# Patient Record
Sex: FEMALE | Race: OTHER | Employment: UNEMPLOYED | URBAN - METROPOLITAN AREA
[De-identification: names, ages, dates, MRNs, and addresses within clinical notes are randomized per-mention and may not be internally consistent; named-entity substitution may affect disease eponyms.]

---

## 2022-04-01 ENCOUNTER — MED REC SCAN ONLY (OUTPATIENT)
Dept: ORTHOPEDICS CLINIC | Facility: CLINIC | Age: 62
End: 2022-04-01

## 2022-05-04 ENCOUNTER — OFFICE VISIT (OUTPATIENT)
Dept: ORTHOPEDICS CLINIC | Facility: CLINIC | Age: 62
End: 2022-05-04

## 2022-05-04 VITALS — WEIGHT: 198.44 LBS | BODY MASS INDEX: 31.15 KG/M2 | HEIGHT: 66.93 IN

## 2022-05-04 DIAGNOSIS — M79.605 LEG PAIN, LEFT: Primary | ICD-10-CM

## 2022-05-04 PROCEDURE — 3008F BODY MASS INDEX DOCD: CPT | Performed by: ORTHOPAEDIC SURGERY

## 2022-05-04 PROCEDURE — 99212 OFFICE O/P EST SF 10 MIN: CPT | Performed by: ORTHOPAEDIC SURGERY

## 2022-05-04 PROCEDURE — 20610 DRAIN/INJ JOINT/BURSA W/O US: CPT | Performed by: ORTHOPAEDIC SURGERY

## 2022-05-04 RX ORDER — BETAMETHASONE SODIUM PHOSPHATE AND BETAMETHASONE ACETATE 3; 3 MG/ML; MG/ML
5.8 INJECTION, SUSPENSION INTRA-ARTICULAR; INTRALESIONAL; INTRAMUSCULAR; SOFT TISSUE ONCE
Status: COMPLETED | OUTPATIENT
Start: 2022-05-04 | End: 2022-05-04

## 2022-05-04 RX ADMIN — BETAMETHASONE SODIUM PHOSPHATE AND BETAMETHASONE ACETATE 6 MG: 3; 3 INJECTION, SUSPENSION INTRA-ARTICULAR; INTRALESIONAL; INTRAMUSCULAR; SOFT TISSUE at 17:30:00

## 2022-05-07 NOTE — PROGRESS NOTES
Patient is a 55-year-old white female here for evaluation of her left leg. Patient's here with her . Patient speaks little bit of English her  more fluent. Patient's been having a problem now for several months with this knee. No specific trauma. Patient states that pain radiates down the leg. Pain starts though somewhere around the knee and the lateral aspect of the knee. Patient has had x-rays taken elsewhere but I am not able to open the file with the imaging. Patient's exam shows that have a mildly antalgic gait. Patient has tenderness along the medial aspect of the left knee. Ligaments are stable. Range of motion shows near full extension and flexion about 135 degrees. Mild effusion of the knee is noted. Negative Homans. Motor exam grossly 5/5 left lower extremity. Sensory intact to light touch. Impression is that of meniscus tear but also some arthritis of the left knee. Recommendations are to go ahead with a cortisone injection today in light of the degree of her pain and then see if we can obtain imaging or open the files for the imaging that she already has. Patient knee was injected with 4 cc Xylocaine and Marcaine and 6 mg of betamethasone. She tolerated the injection well. Follow-up with her in couple weeks if not getting better. We will need to consider an MRI scan if she is not getting better.

## 2022-05-20 ENCOUNTER — HOSPITAL ENCOUNTER (OUTPATIENT)
Age: 62
Discharge: ANOTHER HOSPITAL | End: 2022-05-20

## 2022-05-20 ENCOUNTER — HOSPITAL ENCOUNTER (EMERGENCY)
Facility: HOSPITAL | Age: 62
Discharge: HOME | End: 2022-05-20

## 2022-05-20 VITALS
TEMPERATURE: 98 F | RESPIRATION RATE: 20 BRPM | SYSTOLIC BLOOD PRESSURE: 123 MMHG | HEART RATE: 66 BPM | OXYGEN SATURATION: 94 % | DIASTOLIC BLOOD PRESSURE: 65 MMHG

## 2022-05-20 VITALS
TEMPERATURE: 98 F | DIASTOLIC BLOOD PRESSURE: 85 MMHG | OXYGEN SATURATION: 97 % | HEART RATE: 89 BPM | RESPIRATION RATE: 18 BRPM | SYSTOLIC BLOOD PRESSURE: 145 MMHG | WEIGHT: 198.44 LBS | BODY MASS INDEX: 31.89 KG/M2 | HEIGHT: 66 IN

## 2022-05-20 DIAGNOSIS — U07.1 COVID-19: Primary | ICD-10-CM

## 2022-05-20 LAB — SARS-COV-2 RNA RESP QL NAA+PROBE: DETECTED

## 2022-05-20 PROCEDURE — 99284 EMERGENCY DEPT VISIT MOD MDM: CPT

## 2022-05-20 RX ORDER — BEBTELOVIMAB 87.5 MG/ML
175 INJECTION, SOLUTION INTRAVENOUS ONCE
Status: COMPLETED | OUTPATIENT
Start: 2022-05-20 | End: 2022-05-20

## 2022-05-20 NOTE — ED INITIAL ASSESSMENT (HPI)
Cough since yesterday with body aches. [place] in [place] in [place]22 and was hospitalized 20 days.

## 2022-05-23 ENCOUNTER — LAB ENCOUNTER (OUTPATIENT)
Dept: LAB | Facility: HOSPITAL | Age: 62
End: 2022-05-23
Attending: INTERNAL MEDICINE

## 2022-05-23 ENCOUNTER — TELEPHONE (OUTPATIENT)
Dept: CASE MANAGEMENT | Age: 62
End: 2022-05-23

## 2022-05-23 ENCOUNTER — HOSPITAL ENCOUNTER (OUTPATIENT)
Dept: GENERAL RADIOLOGY | Facility: HOSPITAL | Age: 62
Discharge: HOME | End: 2022-05-23
Attending: INTERNAL MEDICINE

## 2022-05-23 DIAGNOSIS — U07.1 INFECTION DUE TO 2019-NCOV: Primary | ICD-10-CM

## 2022-05-23 DIAGNOSIS — U07.1 INFECTION DUE TO 2019-NCOV: ICD-10-CM

## 2022-05-23 LAB
BASOPHILS # BLD AUTO: 0.03 X10(3) UL (ref 0–0.2)
BASOPHILS NFR BLD AUTO: 0.6 %
DEPRECATED RDW RBC AUTO: 39.4 FL (ref 35.1–46.3)
EOSINOPHIL # BLD AUTO: 0.17 X10(3) UL (ref 0–0.7)
EOSINOPHIL NFR BLD AUTO: 3.4 %
ERYTHROCYTE [DISTWIDTH] IN BLOOD BY AUTOMATED COUNT: 13.2 % (ref 11–15)
HCT VFR BLD AUTO: 41.6 %
HGB BLD-MCNC: 12.9 G/DL
IMM GRANULOCYTES # BLD AUTO: 0.01 X10(3) UL (ref 0–1)
IMM GRANULOCYTES NFR BLD: 0.2 %
LYMPHOCYTES # BLD AUTO: 2.39 X10(3) UL (ref 1–4)
LYMPHOCYTES NFR BLD AUTO: 48.4 %
MCH RBC QN AUTO: 25.6 PG (ref 26–34)
MCHC RBC AUTO-ENTMCNC: 31 G/DL (ref 31–37)
MCV RBC AUTO: 82.7 FL
MONOCYTES # BLD AUTO: 0.33 X10(3) UL (ref 0.1–1)
MONOCYTES NFR BLD AUTO: 6.7 %
NEUTROPHILS # BLD AUTO: 2.01 X10 (3) UL (ref 1.5–7.7)
NEUTROPHILS # BLD AUTO: 2.01 X10(3) UL (ref 1.5–7.7)
NEUTROPHILS NFR BLD AUTO: 40.7 %
PLATELET # BLD AUTO: 211 10(3)UL (ref 150–450)
RBC # BLD AUTO: 5.03 X10(6)UL
WBC # BLD AUTO: 4.9 X10(3) UL (ref 4–11)

## 2022-05-23 PROCEDURE — 85025 COMPLETE CBC W/AUTO DIFF WBC: CPT

## 2022-05-23 PROCEDURE — 71046 X-RAY EXAM CHEST 2 VIEWS: CPT | Performed by: INTERNAL MEDICINE

## 2022-05-23 PROCEDURE — 36415 COLL VENOUS BLD VENIPUNCTURE: CPT

## 2023-05-08 ENCOUNTER — TELEPHONE (OUTPATIENT)
Dept: ORTHOPEDICS CLINIC | Facility: CLINIC | Age: 63
End: 2023-05-08

## 2023-05-08 DIAGNOSIS — M25.562 LEFT KNEE PAIN, UNSPECIFIED CHRONICITY: Primary | ICD-10-CM

## 2023-05-08 DIAGNOSIS — Z01.89 ENCOUNTER FOR LOWER EXTREMITY COMPARISON IMAGING STUDY: ICD-10-CM

## 2023-05-08 NOTE — TELEPHONE ENCOUNTER
Patient has an upcoming appt for LT leg pain/ LEFT KNEE pain . At this time patient has not had any imaging done, please place RX accordingly. Please forward to the  pool to schedule imaging. Thank you.       Future Appointments   Date Time Provider Delores Fernanda   6/2/2023 11:20 AM Rajendra Luna MD [facility]

## 2023-05-22 ENCOUNTER — MED REC SCAN ONLY (OUTPATIENT)
Dept: ORTHOPEDICS CLINIC | Facility: CLINIC | Age: 63
End: 2023-05-22

## 2023-06-02 ENCOUNTER — OFFICE VISIT (OUTPATIENT)
Dept: ORTHOPEDICS CLINIC | Facility: CLINIC | Age: 63
End: 2023-06-02
Payer: MEDICAID

## 2023-06-02 VITALS — BODY MASS INDEX: 31.82 KG/M2 | HEIGHT: 66 IN | WEIGHT: 198 LBS

## 2023-06-02 DIAGNOSIS — M17.12 PRIMARY OSTEOARTHRITIS OF LEFT KNEE: Primary | ICD-10-CM

## 2023-06-02 RX ORDER — ATORVASTATIN CALCIUM 20 MG/1
20 TABLET, FILM COATED ORAL NIGHTLY
COMMUNITY
Start: 2023-05-11

## 2023-06-02 RX ORDER — HYDROCHLOROTHIAZIDE 12.5 MG/1
12.5 TABLET ORAL DAILY
COMMUNITY
Start: 2023-05-24

## 2023-06-03 RX ORDER — TRIAMCINOLONE ACETONIDE 40 MG/ML
40 INJECTION, SUSPENSION INTRA-ARTICULAR; INTRAMUSCULAR ONCE
Status: SHIPPED | OUTPATIENT
Start: 2023-06-03

## 2023-06-03 NOTE — PROGRESS NOTES
Patient is a 79-year-old female here for follow-up on her left knee. Patient's daughter is with her. Patient had a cortisone injection about a year ago which helped her fairly substantially for the last 10 months. Patient states that over the last month or so she has been having some increasing pain. Patient's had the surgery of her left knee for a tibial plateau fracture. This was done over in [facility] where she was at the time of the injury. Patient's exam shows that mild varus alignment of the left knee. She has tenderness along the medial and lateral aspect of the knee. There is no tenderness over the fracture site or over the proximal tibia. Patient has near full extension of the left knee and flexion to about 125 degrees. Exam of her right knee shows her to have no significant swelling. Mild crepitation patellofemoral joint. Range of motion of the right knee is full. Neurologically she is intact lower extremities to light touch. Motor exam is grossly 5/5. Patient's x-rays of her left knee shows the fracture to be well-healed. Internal fixation is in good position. Moderate degenerative changes of the tibiofemoral joint noted. Right knee shows well-maintained tibiofemoral and patellofemoral joints. Impression is that of posttraumatic degenerative arthritis of the left knee. Second impression is that well-healed proximal tibia fracture with retained hardware. Third impression is that of stable right knee with mild chondromalacia of the patellofemoral joint. Recommendation is to go ahead with a cortisone injection of the left knee which was done under sterile technique through an anterolateral approach with 4 cc of Xylocaine and Marcaine 40 mg of Kenalog. Patient tolerated the injection well. No complications. Patient was advised to follow-up as needed. I did reassure the daughter that the surgery that was performed was done well and that her healing has progressed well.   Patient will be a possible candidate for a total knee arthroplasty in the future. Presently the cortisone injections are effective and if these are not working we can always try a gel injection.

## 2024-01-26 ENCOUNTER — TELEPHONE (OUTPATIENT)
Dept: ORTHOPEDICS CLINIC | Facility: CLINIC | Age: 64
End: 2024-01-26

## 2024-01-26 NOTE — TELEPHONE ENCOUNTER
XR scheduled and patient was notified via [employer] to let them know that they should arrive 15-20 minutes early, in order for them to complete imaging.

## 2024-01-29 ENCOUNTER — HOSPITAL ENCOUNTER (OUTPATIENT)
Dept: GENERAL RADIOLOGY | Age: 64
Discharge: HOME | End: 2024-01-29
Attending: ORTHOPAEDIC SURGERY
Payer: MEDICAID

## 2024-01-29 ENCOUNTER — OFFICE VISIT (OUTPATIENT)
Dept: ORTHOPEDICS CLINIC | Facility: CLINIC | Age: 64
End: 2024-01-29
Payer: MEDICAID

## 2024-01-29 VITALS — BODY MASS INDEX: 31.82 KG/M2 | WEIGHT: 198 LBS | HEIGHT: 66 IN

## 2024-01-29 DIAGNOSIS — M25.562 LEFT KNEE PAIN, UNSPECIFIED CHRONICITY: ICD-10-CM

## 2024-01-29 DIAGNOSIS — Z01.89 ENCOUNTER FOR LOWER EXTREMITY COMPARISON IMAGING STUDY: ICD-10-CM

## 2024-01-29 DIAGNOSIS — M17.32 POST-TRAUMATIC OSTEOARTHRITIS OF LEFT KNEE: Primary | ICD-10-CM

## 2024-01-29 DIAGNOSIS — Z96.9 RETAINED ORTHOPEDIC HARDWARE: ICD-10-CM

## 2024-01-29 PROCEDURE — 73562 X-RAY EXAM OF KNEE 3: CPT | Performed by: ORTHOPAEDIC SURGERY

## 2024-01-29 PROCEDURE — 99213 OFFICE O/P EST LOW 20 MIN: CPT | Performed by: ORTHOPAEDIC SURGERY

## 2024-01-29 PROCEDURE — 3008F BODY MASS INDEX DOCD: CPT | Performed by: ORTHOPAEDIC SURGERY

## 2024-01-29 PROCEDURE — 73564 X-RAY EXAM KNEE 4 OR MORE: CPT | Performed by: ORTHOPAEDIC SURGERY

## 2024-01-30 NOTE — PROGRESS NOTES
[facility] New Patient Note    Chief Complaint   Patient presents with    Knee Pain     LT KNEE PAIN; ONSET: 2018 (Hx of surgery)  Dr. Lee pt       HPI: The patient is a 64 year old female who presents with her daughter with complaints of chronic intermittent right knee pain.  She has a history [place] of [place]  She has struggled with progressive pain at this left knee from posttraumatic osteoarthritis.  Her daughter relates history of symptomatic relief from corticosteroid injections most recently provided by Dr. Chaitanya Lee in June 2023.   She spends a portion of her year in [place] and is planning to return in the spring.  They inquire as to whether repeat cortisone injection would be required or necessary.    Past Medical History:   Diagnosis Date    COVID     DVT (deep venous thrombosis) ([facility])      History reviewed. No pertinent surgical history.  Current Outpatient Medications   Medication Sig Dispense Refill    atorvastatin 20 MG Oral Tab Take 1 tablet (20 mg total) by mouth nightly. TAKE AT BEDTIME      hydroCHLOROthiazide 12.5 MG Oral Tab Take 1 tablet (12.5 mg total) by mouth daily.       No Known Allergies  History reviewed. No pertinent family history.  Social History     Occupational History    Not on file   Tobacco Use    Smoking status: Never    Smokeless tobacco: Never   Substance and Sexual Activity    Alcohol use: Never    Drug use: Never    Sexual activity: Not on file        ROS:  Complete ROS reviewed by me and non-contributory to the chief complaint except as mentioned above.    Physical Exam:    Ht 5' 6\" (1.676 m)   Wt 198 lb (89.8 kg)   BMI 31.96 kg/m²   Constitutional: Well developed, well nourished 64 year old female  Psychological: NAD, alert and appropriate  Respiratory: Breathing comfortably on room air with RR of 10-14  Cardiac: Palpable distal pulses with pink warm extremities  Neurovascular status is intact  on sensory, motor and perfusion assessment distally.    Imaging: Multiple views left knee personally viewed, demonstrating posterior medial plate fixation with no acute fracture or dislocation.  Significant lateral compartment arthritic changes and narrowing is seen.    XR KNEE, COMPLETE (4 OR MORE VIEWS), LEFT (CPT=73564)    Result Date: 1/29/2024  CONCLUSION:   No evidence of acute fracture or dislocation.  Previous ORIF proximal left tibial which appears grossly intact.  Moderate left knee osteoarthritis most significant in the medial compartment.    Dictated by (CST): Dusty Ann MD on 1/29/2024 at 8:38 AM     Finalized by (CST): uDsty Ann MD on 1/29/2024 at 8:40 AM          XR KNEE (3 VIEWS), RIGHT (CPT=73562)    Result Date: 1/29/2024  CONCLUSION:  1. No acute appearing fracture or dislocation.  Minimal narrowing of the medial joint space of the right knee.  Postsurgical internal fixation changes at the proximal tibia grossly intact.  Moderate degenerative narrowing of the lateral joint space with bone-on-bone contact on weight-bearing views with mild genu valgum deformity moderate bony proliferative change.  Mild narrowing of the medial joint space of the left knee.  Correlate clinically.    Dictated by (CST): Harry Myers MD on 1/29/2024 at 8:24 AM     Finalized by (CST): Harry Myers MD on 1/29/2024 at 8:26 AM             Assessment/Diagnoses:  Diagnoses and all orders for this visit:    Post-traumatic osteoarthritis of left knee    Retained orthopedic hardware      Plan:  I reviewed imaging and exam findings with the patient and her daughter.  There are no acute abnormalities on the x-ray but posttraumatic osteoarthritis is affecting this left knee mostly at the lateral compartment.  Treatment options were reviewed including continued conservative observation versus injection treatments.  At some point she may become a candidate for hardware removal and conversion to total knee  arthroplasty if her symptoms become severe.  For now she is functioning reasonably well without assistive devices and is not experiencing much pain.  Repeat cortisone injections may elevate her risk for periprosthetic infection given the periarticular nature of her implants.  I therefore recommend against injection today given that she is functioning quite well with minimal pain.  All questions were answered and they verbalized understanding and appreciation.  Follow-up with us as needed.      Soila Damon MD  [facility]      This document was partially prepared using Dragon Medical voice recognition software.